# Patient Record
Sex: MALE | Race: WHITE
[De-identification: names, ages, dates, MRNs, and addresses within clinical notes are randomized per-mention and may not be internally consistent; named-entity substitution may affect disease eponyms.]

---

## 2018-03-19 ENCOUNTER — HOSPITAL ENCOUNTER (EMERGENCY)
Dept: HOSPITAL 45 - C.EDB | Age: 23
Discharge: HOME | End: 2018-03-19
Payer: COMMERCIAL

## 2018-03-19 VITALS
BODY MASS INDEX: 26.35 KG/M2 | BODY MASS INDEX: 26.35 KG/M2 | HEIGHT: 69.02 IN | HEIGHT: 69.02 IN | WEIGHT: 177.91 LBS | WEIGHT: 177.91 LBS

## 2018-03-19 VITALS — SYSTOLIC BLOOD PRESSURE: 132 MMHG | DIASTOLIC BLOOD PRESSURE: 63 MMHG | HEART RATE: 47 BPM | OXYGEN SATURATION: 97 %

## 2018-03-19 VITALS — TEMPERATURE: 97.7 F

## 2018-03-19 DIAGNOSIS — M54.5: Primary | ICD-10-CM

## 2018-03-19 NOTE — EMERGENCY ROOM VISIT NOTE
History


Report prepared by Jose:  Bam Ohara


Under the Supervision of:  MANUEL ZuluagaO.


First contact with patient:  01:15


Chief Complaint:  BACK PAIN


Stated Complaint:  LOWER BACK PAIN





History of Present Illness


The patient is a 22 year old male who presents to the Emergency Room with 

complaints of constant lower back pain beginning earlier today. The patient 

states that he was playing soccer today and was ran into by another player. He 

notes that he heard "two cracks" in his right lower back when the other player 

struck his back. He reports that he initially felt like his pain was muscular 

but was unsure because his pain worsened throughout the remainder of the soccer 

game. The patient states that he is currently experiencing a sharp pain that 

worsens whenever he moves. He notes that his pain also worsens and becomes 

tight when he takes a deep breath. He reports that his pain stays in his back 

and moves upward to his chest. He denies any abdominal pain, pelvic pain, and 

numbness. The patient states that he took an Aleve and used a cryogenic spray 

today with no relief of his pain.





   Source of History:  patient


   Onset:  today


   Position:  back (lower)


   Quality:  sharp, other (tight)


   Timing:  constant


   Modifying Factors (Worsening):  breathing, movement


   Associated Symptoms:  + chest pain, No abdominal pain, No numbness


Note:


The patient denies any pelvic pain.





Review of Systems


See HPI for pertinent positives & negatives. A total of 6 systems reviewed and 

were otherwise negative.





Past Medical & Surgical


Medical Problems:


(1) No chronic problems








Family History


No pertinent family history stated.





Social History


Smoking Status:  Never Smoker


Marital Status:  single


Housing Status:  lives with roommate


Occupation Status:  student





Current/Historical Medications


Scheduled PRN


Cyclobenzaprine Hcl (Flexeril), 10 MG PO TID PRN for Muscle Spasms


Oxycodone Ir (Roxicodone Ir), 1 TAB PO Q6 PRN for Pain





Allergies


Coded Allergies:  


     No Known Allergies (Unverified , 3/19/18)





Physical Exam


Vital Signs











  Date Time  Temp Pulse Resp B/P (MAP) Pulse Ox O2 Delivery O2 Flow Rate FiO2


 


3/19/18 03:45  47 20 132/63 97   


 


3/19/18 02:51  47 16 126/67 96 Room Air  


 


3/19/18 00:10 36.5 49 18 143/75 98 Room Air  











Physical Exam


GENERAL: alert, well appearing, well nourished, no distress, non-toxic 


LUNGS: Clear to auscultation. Normal chest wall mechanics


HEART: no murmurs, S1 normal and S2 normal 


ABDOMEN: abdomen soft, non-tender, normo-active bowel sounds, no masses, no 

rebound or guarding. 


BACK: Back is symmetrical on inspection and there is no deformity, no midline 

tenderness or stepoff, no CVA tenderness. Tender at the junction of the T and L 

spine in the right perispinal region, no ecchymosis, no crepitus.


SKIN: no rashes and no bruising 


UPPER EXTREMITIES: upper extremities are grossly normal. 


LOWER EXTREMITIES: No pitting edema. No bony tenderness to the pelvis and hips. 


NEURO EXAM: Normal sensorium, cranial nerves II-XII grossly intact, normal 

speech,  no gross weakness of arms, no gross weakness of legs.





Medical Decision & Procedures


ER Provider


Diagnostic Interpretation:


Radiology results have been interpreted and reviewed by me.





Lumbar Spine X-RAY:





No subluxation.


Questionable small anterior end plate fracture at t12.





Medications Administered











 Medications


  (Trade)  Dose


 Ordered  Sig/Mychal


 Route  Start Time


 Stop Time Status Last Admin


Dose Admin


 


 Cyclobenzaprine


 HCl


  (Flexeril Tab)  10 mg  NOW  STAT


 PO  3/19/18 01:26


 3/19/18 01:28 DC 3/19/18 01:36


10 MG


 


 Acetaminophen


  (Tylenol Tab)  650 mg  NOW  STAT


 PO  3/19/18 01:26


 3/19/18 01:28 DC 3/19/18 01:36


650 MG


 


 Lidocaine


  (Lidoderm Patch


 5%)  1 patch  STK-MED ONCE


 .ROUTE  3/19/18 01:31


 3/19/18 01:32 DC 3/19/18 01:36


1 PATCH


 


 Tramadol HCl


  (Ultram Tab)  50 mg  NOW  STAT


 PO  3/19/18 03:08


 3/19/18 03:09 DC 3/19/18 03:23


50 MG











ED Course


0118: The patient was evaluated in room B9. A complete history and physical 

exam was performed. 





0126: Acetaminophen 650mg PO, Cyclobenzaprine HCl 10mg PO





0243: Upon reevaluation, the patient is feeling better. I discussed the 

findings and the treatment plan with the patient.  He verbalizes agreement and 

understanding.  The patient was discharged home.





0308: Tramadol HCl 50mg PO





Medical Decision


Differential diagnosis:


Etiologies such as musculoskeletal, disc herniation, fracture, aortic disease, 

metastatic disease, cord compression, discitis, infection, renal colic, 

gastrointestinal, acute exacerbation of chronic back pain, sciatica, cauda 

equina, as well as others were entertained.





No hematuria noted on urine dip.  Patient's pain mildly improved with 

medication.  Patient with no prior history of back injury.  I have a low 

suspicion for additional occult trauma including fracture or other spinal 

injury.  Patient with no other paresthesias or other evidence of neurologic 

compromise.  Patient able to ambulate here with a steady gait.  Discussed with 

patient follow-up as a precaution, avoidance of strenuous activity including PT 

as he is in [facility], until his symptoms are improved and he is reevaluated by 

physician.  Discussed use of his medications including a muscle relaxer and 

pain medication, avoidance of driving and alcohol use while taking these, 

discussed symptoms to watch and return for, he verbalized understanding was 

agreeable with plan.





Medication Reconcilliation


Current Medication List:  was personally reviewed by me





Blood Pressure Screening


Patient's blood pressure:  Normal blood pressure


Blood pressure disposition:  Did not require urgent referral





Impression





 Primary Impression:  


 Acute back pain





Scribe Attestation


The scribe's documentation has been prepared under my direction and personally 

reviewed by me in its entirety. I confirm that the note above accurately 

reflects all work, treatment, procedures, and medical decision making performed 

by me.





Departure Information


Dispostion


Home / Self-Care





Prescriptions





Oxycodone Ir (Roxicodone Ir) 5 Mg Tab


1 TAB PO Q6 Y for Pain, #10 TAB


   Prov: Lisa Sanchez, DO         3/19/18 


Cyclobenzaprine Hcl (FLEXERIL) 10 Mg Tab


10 MG PO TID Y for Muscle Spasms, #15 TAB


   Prov: Lisa Sanchez, DO         3/19/18





Referrals


No Doctor, Assigned (PCP)





Forms


HOME CARE DOCUMENTATION FORM,                                                 

               IMPORTANT VISIT INFORMATION





Patient Instructions


My [facility]





Additional Instructions





Please avoid any strenuous activity/heavy lifting until your injury has healed 

and you are seen and cleared by a doctor.  You may use tylenol/ibuprofen if you 

need to go to class or drive.  You may use the muscle relaxer and stronger pain 

medicine as directed, but don't take them as drive or drink alcohol.  If you 

have persistent or worsening pain, you may need specialty evaluation or 

additional imaging.  If you have worsening pain, are unable to urinate, develop 

fevers/chills, numbness/tingling, abdominal pain, pain radiating into your leg, 

vomiting, or you have any other new or concerning symptoms, please return to 

the emergency room.  Please let your [facility] commanders know you were given 

stronger pain medicine and muscle relaxers and that you should not participate 

in PT until you are rechecked by a doctor.





Problem Qualifiers








 Primary Impression:  


 Acute back pain


 Back pain location:  low back pain  Back pain laterality:  right  Sciatica 

presence:  without sciatica  Qualified Codes:  M54.5 - Low back pain

## 2018-03-19 NOTE — DIAGNOSTIC IMAGING REPORT
L-SPINE MIN 4 VIEWS ROUTINE



CLINICAL HISTORY: 22 years-old Male presenting with trauma, pain after playing

soccer. 



TECHNIQUE: Frontal, bilateral oblique, lateral, and coned in lateral views of

the lumbar spine were obtained. 



COMPARISON: None.



FINDINGS:

No scoliosis. Questionable pars defect on the left at L5. No compression

deformity or subluxation. Vertebral bodies maintain normal height and alignment.

Intervertebral disc spaces preserved. No significant degenerative change. No

gross evidence of osseous neural foraminal narrowing. No transitional

lumbosacral anatomy.



Moderate stool burden throughout the colon. Mottled lucencies in the left upper

quadrant likely in the stomach.



IMPRESSION:

1.  No radiographic evidence of acute osseous injury.



2.  Possible pars defect on the left at L5.



The report will be called/faxed according to standard departmental protocol.







Electronically signed by:  Bryan Bobby M.D.

3/19/2018 7:13 AM



Dictated Date/Time:  3/19/2018 7:10 AM